# Patient Record
Sex: MALE | Race: WHITE | Employment: OTHER | URBAN - METROPOLITAN AREA
[De-identification: names, ages, dates, MRNs, and addresses within clinical notes are randomized per-mention and may not be internally consistent; named-entity substitution may affect disease eponyms.]

---

## 2019-12-20 ENCOUNTER — TRANSFERRED RECORDS (OUTPATIENT)
Dept: HEALTH INFORMATION MANAGEMENT | Facility: CLINIC | Age: 84
End: 2019-12-20

## 2020-01-06 ENCOUNTER — TRANSFERRED RECORDS (OUTPATIENT)
Dept: HEALTH INFORMATION MANAGEMENT | Facility: CLINIC | Age: 85
End: 2020-01-06

## 2020-01-16 ENCOUNTER — TRANSFERRED RECORDS (OUTPATIENT)
Dept: HEALTH INFORMATION MANAGEMENT | Facility: CLINIC | Age: 85
End: 2020-01-16

## 2020-02-17 ENCOUNTER — OFFICE VISIT (OUTPATIENT)
Dept: NEUROSURGERY | Facility: CLINIC | Age: 85
End: 2020-02-17
Attending: NEUROLOGICAL SURGERY
Payer: MEDICARE

## 2020-02-17 VITALS
WEIGHT: 198 LBS | SYSTOLIC BLOOD PRESSURE: 112 MMHG | BODY MASS INDEX: 26.82 KG/M2 | HEIGHT: 72 IN | RESPIRATION RATE: 16 BRPM | HEART RATE: 68 BPM | OXYGEN SATURATION: 98 % | DIASTOLIC BLOOD PRESSURE: 73 MMHG

## 2020-02-17 DIAGNOSIS — M51.369 LUMBAR DEGENERATIVE DISC DISEASE: Primary | ICD-10-CM

## 2020-02-17 PROCEDURE — 99204 OFFICE O/P NEW MOD 45 MIN: CPT | Performed by: NEUROLOGICAL SURGERY

## 2020-02-17 PROCEDURE — G0463 HOSPITAL OUTPT CLINIC VISIT: HCPCS

## 2020-02-17 ASSESSMENT — PAIN SCALES - GENERAL: PAINLEVEL: SEVERE PAIN (7)

## 2020-02-17 ASSESSMENT — MIFFLIN-ST. JEOR: SCORE: 1626.12

## 2020-02-17 NOTE — PROGRESS NOTES
Mr. Santos is an 84-year-old man well-known to me from prior lumbar surgery for decompression of the L4-5 level performed at [facility] several years previously.  He did well after that surgery and was recently seen by my former partner Dr. Martines for evaluation of left hip and left knee pain.  I have had the opportunity to review the notes from [facility].  Repeat lumbar MRI scan obtained at [facility] recently showed evidence of multilevel degenerative lumbar disc changes most pronounced at right L4-5.  Mr. Santos was also noted to have a left L5-S1 severe foraminal stenosis.  Dr. Martines saw him, performed an examination, reviewed the MRI scan and stated that he did not see a structural cause for continuing left leg pain in the lumbar spine.  Since that time, he is undergone an injection into his left hip and possibly into the lateral aspect of his left knee.  He reported significant symptomatic improvement after those injections.  He notes that a prior left L5-S1 selective foraminal epidural steroid injection was not helpful in reducing symptoms into his left leg beyond the anesthetic phase.  Presently, he has no complaint of weakness or numbness in his legs.  His symptoms are not consistent with neurogenic claudication and his residual pain is intermittent near his left buttock.  He is also experienced point tenderness over the left tibial plateau which has been reproducible with pressure in that area.    On examination he is able to rise from a seated to a standing position without difficulty.  Gait is slow but without evidence of antalgic gait.  Plantar and dorsiflexion at the ankles is well-preserved and equal.  Deep tendon reflexes are diffusely diminished through his lower extremities and there are no long track findings noted.  There is no evidence of dermatomal sensory disturbance.  Straight leg raising is negative for radicular symptoms bilaterally.    I have also had the opportunity  to review the recent lumbar MRI scan obtained at [facility].  I do not believe that his symptoms now are previously were consistent with an L5 radiculopathy and see no indication for surgical intervention.  I reviewed the clinical and radiographic findings in some detail with the patient and his .  He has been referred to physician neck and back clinic for strengthening and therapy which I believe is an appropriate recommendation.  I answered his questions to his apparent satisfaction ultimately, he seemed relieved that surgery was not contemplated and I am encouraged that his symptoms have substantially diminished in the past several weeks.  I have asked him to call or return to the office in any time should his symptoms return to the previous level severity or change significantly in character or location.    More than 45 minutes was spent in direct contact with Mr. Santos, reviewing relevant radiographic findings and outside clinical notes as well as counseling him regarding differential diagnosis, prognosis and management options.

## 2020-02-17 NOTE — LETTER
2/17/2020         RE: Riley Santos  [address] [id number]        Dear Colleague,    Thank you for referring your patient, Riley Santos, to the [facility] NEUROS[facility]ERY [facility]INIC. Please see a copy of my visit note below.    Mr. Santos is an 84-year-old man well-known to me from prior lumbar surgery for decompression of the L4-5 level performed at [facility] several years previously.  He did well after that surgery and was recently seen by my former partner Dr. Martines for evaluation of left hip and left knee pain.  I have had the opportunity to review the notes from [facility].  Repeat lumbar MRI scan obtained at [facility] recently showed evidence of multilevel degenerative lumbar disc changes most pronounced at right L4-5.  Mr. Santos was also noted to have a left L5-S1 severe foraminal stenosis.  Dr. Martines saw him, performed an examination, reviewed the MRI scan and stated that he did not see a structural cause for continuing left leg pain in the lumbar spine.  Since that time, he is undergone an injection into his left hip and possibly into the lateral aspect of his left knee.  He reported significant symptomatic improvement after those injections.  He notes that a prior left L5-S1 selective foraminal epidural steroid injection was not helpful in reducing symptoms into his left leg beyond the anesthetic phase.  Presently, he has no complaint of weakness or numbness in his legs.  His symptoms are not consistent with neurogenic claudication and his residual pain is intermittent near his left buttock.  He is also experienced point tenderness over the left tibial plateau which has been reproducible with pressure in that area.    On examination he is able to rise from a seated to a standing position without difficulty.  Gait is slow but without evidence of antalgic gait.  Plantar and dorsiflexion at the ankles is well-preserved and equal.  Deep tendon  reflexes are diffusely diminished through his lower extremities and there are no long track findings noted.  There is no evidence of dermatomal sensory disturbance.  Straight leg raising is negative for radicular symptoms bilaterally.    I have also had the opportunity to review the recent lumbar MRI scan obtained at [facility].  I do not believe that his symptoms now are previously were consistent with an L5 radiculopathy and see no indication for surgical intervention.  I reviewed the clinical and radiographic findings in some detail with the patient and his .  He has been referred to physician neck and back clinic for strengthening and therapy which I believe is an appropriate recommendation.  I answered his questions to his apparent satisfaction ultimately, he seemed relieved that surgery was not contemplated and I am encouraged that his symptoms have substantially diminished in the past several weeks.  I have asked him to call or return to the office in any time should his symptoms return to the previous level severity or change significantly in character or location.    More than 45 minutes was spent in direct contact with Mr. Santos, reviewing relevant radiographic findings and outside clinical notes as well as counseling him regarding differential diagnosis, prognosis and management options.    Again, thank you for allowing me to participate in the care of your patient.        Sincerely,        Prieto Norwood MD

## 2020-02-17 NOTE — NURSING NOTE
Riley Santos is a 84 year old male who presents for:  Chief Complaint   Patient presents with     Consult For     lumbar radiculopathy        Initial Vitals:  /73   Pulse 68   Resp 16   Ht 6' (1.829 m)   Wt 198 lb (89.8 kg)   SpO2 98%   BMI 26.85 kg/m   Estimated body mass index is 26.85 kg/m  as calculated from the following:    Height as of this encounter: 6' (1.829 m).    Weight as of this encounter: 198 lb (89.8 kg).. Body surface area is 2.14 meters squared. BP completed using cuff size: regular  Severe Pain (7)    Nursing Comments: Pt present today for consult of LBP.    Thong Chiu, CMA

## 2020-06-23 ENCOUNTER — OFFICE VISIT (OUTPATIENT)
Dept: NEUROSURGERY | Facility: CLINIC | Age: 85
End: 2020-06-23
Attending: NEUROLOGICAL SURGERY
Payer: MEDICARE

## 2020-06-23 VITALS
HEIGHT: 72 IN | OXYGEN SATURATION: 96 % | RESPIRATION RATE: 16 BRPM | SYSTOLIC BLOOD PRESSURE: 157 MMHG | WEIGHT: 198 LBS | HEART RATE: 74 BPM | DIASTOLIC BLOOD PRESSURE: 91 MMHG | BODY MASS INDEX: 26.82 KG/M2

## 2020-06-23 DIAGNOSIS — M51.369 LUMBAR DEGENERATIVE DISC DISEASE: Primary | ICD-10-CM

## 2020-06-23 PROCEDURE — 99215 OFFICE O/P EST HI 40 MIN: CPT | Performed by: NEUROLOGICAL SURGERY

## 2020-06-23 PROCEDURE — G0463 HOSPITAL OUTPT CLINIC VISIT: HCPCS

## 2020-06-23 ASSESSMENT — MIFFLIN-ST. JEOR: SCORE: 1626.12

## 2020-06-23 ASSESSMENT — PAIN SCALES - GENERAL: PAINLEVEL: EXTREME PAIN (9)

## 2020-06-23 NOTE — LETTER
6/23/2020         RE: Riley Santos  [address]  [place] [address]        Dear Colleague,    Thank you for referring your patient, Riley Santos, to the [facility]UROSUR[facility]ERY [facility]INIC. Please see a copy of my visit note below.    Mr. Santos returns requesting reevaluation of intractable left hip and left knee pain.  I reviewed prior history including my initial consultation on Mr. Santos, Dr. Martines neurosurgical evaluation from [facility] and recent notes from [facility] [place] from Dr. Belem Truong.  I repeated portions of the examination specifically for details of the distribution of Mr. Santos's pain complaints.  His primary complaint presently appears to be intractable pain just below his left patella extending downward toward the tibia and over the lateral aspect of his knee.  He reports is particularly painful when weightbearing or walking.  He also complains of pain over the left lateral hip extending toward the buttock.    Review once again of his most recent lumbar MRI scan shows multilevel lumbar spondylitic change but no compelling evidence of focal neurologic impingement on the left side of the canal.  Comparison of this study with a prior study obtained in December 2019 to my review shows no significant interval change.    I reviewed the differential diagnosis once again with the patient and his friend in great detail.  I told them that I do not believe that this is a neurologic issue and that no therapy or surgery directed at his lumbar spine would likely prove to provide any degree of symptomatic relief.  He is scheduled to see a pain clinic provider later this week.  A second opinion regarding orthopedic evaluation of his left hip and left knee might also be considered.    More than 40 minutes was spent in direct contact with the patient reviewing clinical and radiographic findings and through a very long discussion of all other diagnostic and  therapeutic possibilities.    Again, thank you for allowing me to participate in the care of your patient.        Sincerely,        Prieto Norwood MD

## 2020-06-23 NOTE — PROGRESS NOTES
Mr. Santos returns requesting reevaluation of intractable left hip and left knee pain.  I reviewed prior history including my initial consultation on Mr. Santos, Dr. Martines neurosurgical evaluation from [facility] and recent notes from [facility] orthopedic center from Dr. Belem Truong.  I repeated portions of the examination specifically for details of the distribution of Mr. Santos's pain complaints.  His primary complaint presently appears to be intractable pain just below his left patella extending downward toward the tibia and over the lateral aspect of his knee.  He reports is particularly painful when weightbearing or walking.  He also complains of pain over the left lateral hip extending toward the buttock.    Review once again of his most recent lumbar MRI scan shows multilevel lumbar spondylitic change but no compelling evidence of focal neurologic impingement on the left side of the canal.  Comparison of this study with a prior study obtained in December 2019 to my review shows no significant interval change.    I reviewed the differential diagnosis once again with the patient and his friend in great detail.  I told them that I do not believe that this is a neurologic issue and that no therapy or surgery directed at his lumbar spine would likely prove to provide any degree of symptomatic relief.  He is scheduled to see a pain clinic provider later this week.  A second opinion regarding orthopedic evaluation of his left hip and left knee might also be considered.    More than 40 minutes was spent in direct contact with the patient reviewing clinical and radiographic findings and through a very long discussion of all other diagnostic and therapeutic possibilities.

## 2020-06-23 NOTE — NURSING NOTE
Riley Santos is a 84 year old male who presents for:  Chief Complaint   Patient presents with     Consult For     Lumbar        Initial Vitals:  BP (!) 157/91   Pulse 74   Resp 16   Ht 6' (1.829 m)   Wt 198 lb (89.8 kg)   SpO2 96%   BMI 26.85 kg/m   Estimated body mass index is 26.85 kg/m  as calculated from the following:    Height as of this encounter: 6' (1.829 m).    Weight as of this encounter: 198 lb (89.8 kg).. Body surface area is 2.14 meters squared. BP completed using cuff size: regular  Extreme Pain (9)    Nursing Comments: Pt present today for lumbar.    Thong Chiu, CMA

## 2020-11-21 ENCOUNTER — OFFICE VISIT (OUTPATIENT)
Dept: URGENT CARE | Facility: URGENT CARE | Age: 85
End: 2020-11-21
Payer: MEDICARE

## 2020-11-21 VITALS
SYSTOLIC BLOOD PRESSURE: 102 MMHG | TEMPERATURE: 97.1 F | DIASTOLIC BLOOD PRESSURE: 62 MMHG | BODY MASS INDEX: 26.85 KG/M2 | OXYGEN SATURATION: 99 % | HEART RATE: 89 BPM | WEIGHT: 198 LBS

## 2020-11-21 DIAGNOSIS — M79.89 SWELLING OF LIMB: Primary | ICD-10-CM

## 2020-11-21 LAB
BASOPHILS # BLD AUTO: 0 10E9/L (ref 0–0.2)
BASOPHILS NFR BLD AUTO: 0.1 %
DIFFERENTIAL METHOD BLD: NORMAL
EOSINOPHIL # BLD AUTO: 0.2 10E9/L (ref 0–0.7)
EOSINOPHIL NFR BLD AUTO: 2.6 %
LYMPHOCYTES # BLD AUTO: 1 10E9/L (ref 0.8–5.3)
LYMPHOCYTES NFR BLD AUTO: 13.3 %
MONOCYTES # BLD AUTO: 0.8 10E9/L (ref 0–1.3)
MONOCYTES NFR BLD AUTO: 10.3 %
NEUTROPHILS # BLD AUTO: 5.7 10E9/L (ref 1.6–8.3)
NEUTROPHILS NFR BLD AUTO: 73.7 %
WBC # BLD AUTO: 7.7 10E9/L (ref 4–11)

## 2020-11-21 PROCEDURE — 85004 AUTOMATED DIFF WBC COUNT: CPT | Performed by: PHYSICIAN ASSISTANT

## 2020-11-21 PROCEDURE — 80048 BASIC METABOLIC PNL TOTAL CA: CPT | Performed by: PHYSICIAN ASSISTANT

## 2020-11-21 PROCEDURE — 99203 OFFICE O/P NEW LOW 30 MIN: CPT | Performed by: PHYSICIAN ASSISTANT

## 2020-11-21 PROCEDURE — 36415 COLL VENOUS BLD VENIPUNCTURE: CPT | Performed by: PHYSICIAN ASSISTANT

## 2020-11-21 PROCEDURE — 85048 AUTOMATED LEUKOCYTE COUNT: CPT | Performed by: PHYSICIAN ASSISTANT

## 2020-11-21 NOTE — PATIENT INSTRUCTIONS
Rest, Fluids    Follow up with Care team Monday    If you develop pain, warmth, fever, numbness, chest pain follow up in Emergency Department.     We will call you with metabolic panel tomorrow if abnormal, in the meantime, monitor your sugar and follow up as needed    Follow up with any changes

## 2020-11-22 LAB
ANION GAP SERPL CALCULATED.3IONS-SCNC: <1 MMOL/L (ref 3–14)
BUN SERPL-MCNC: 23 MG/DL (ref 7–30)
CALCIUM SERPL-MCNC: 8.5 MG/DL (ref 8.5–10.1)
CHLORIDE SERPL-SCNC: 110 MMOL/L (ref 94–109)
CO2 SERPL-SCNC: 28 MMOL/L (ref 20–32)
CREAT SERPL-MCNC: 1.06 MG/DL (ref 0.66–1.25)
GFR SERPL CREATININE-BSD FRML MDRD: 63 ML/MIN/{1.73_M2}
GLUCOSE SERPL-MCNC: 156 MG/DL (ref 70–99)
POTASSIUM SERPL-SCNC: 4.4 MMOL/L (ref 3.4–5.3)
SODIUM SERPL-SCNC: 138 MMOL/L (ref 133–144)

## 2020-11-22 NOTE — PROGRESS NOTES
SUBJECTIVE:  Riley Santos is a 85 year old male who presents to the clinic today for groin and leg swelling.  Has been present for 3 weeks since surgery.  Spoke with surgical team this week that stated symptoms are normal for hip replacement.  Pt states no pain.  No redness.  No warmth.  No dicharge. Of note patient not been icing as instructed, or doing physical therapy exercises that have been prescribed. Symptoms have not worsened, just aren't improving.      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     Diabetes (H)     type 2     HTN (hypertension)      Hyperlipidemia      Pneumonia 1/2015    tx with [facility]     Radiculopathy, lumbar region      Right sided sciatica      Stenosis of cervical spine      Thrombocytopenia (H)      Current Outpatient Medications   Medication Sig Dispense Refill     ASPIRIN PO Take 81 mg by mouth daily       Atorvastatin Calcium (LIPITOR PO) Take 20 mg by mouth daily       Carvedilol (COREG PO) Take 50 mg by mouth 2 times daily (with meals)       Fexofenadine HCl (ALLEGRA PO) Take 180 mg by mouth       GLYBURIDE PO Take 10 mg by mouth 2 times daily (with meals)       insulin aspart (NOVOLOG FLEXPEN) 100 UNIT/ML soln Inject 10 Units Subcutaneous 3 times daily (with meals)       insulin NPH (HUMULIN N, NOVOLIN N) 100 UNIT/ML VIAL Inject Subcutaneous 2 times daily (before meals)       LISINOPRIL PO Take 20 mg by mouth daily       METFORMIN HCL PO Take 750 mg by mouth 2 times daily (with meals)       NITROGLYCERIN SL Place 0.4 mg under the tongue       Social History     Tobacco Use     Smoking status: Never Smoker     Smokeless tobacco: Never Used   Substance Use Topics     Alcohol use: Not on file       ROS:  10 point ROS negative except as listed above      EXAM:   /62   Pulse 89   Temp 97.1  F (36.2  C) (Tympanic)   Wt 89.8 kg (198 lb)   SpO2 99%   BMI 26.85 kg/m    GENERAL: alert,  no acute distress.  SKIN: swelling of thigh, and groin. Non-tender, no erythema  GROIN: no swelling of scrotum.  Patient notes occasionally tissue in foreskin with swell.  No pain and no issues with retraction.  No discharge.  EYES: conjunctiva clear  RESP: lungs clear to auscultation - no rales, rhonchi or wheezes  CV: regular rates and rhythm, normal S1 S2, no murmur noted  NEURO: Normal strength and tone, sensory exam grossly normal,  normal speech and mentation      Results for orders placed or performed in visit on 11/21/20   WBC with Diff     Status: None   Result Value Ref Range    WBC 7.7 4.0 - 11.0 10e9/L    Diff Method Automated Method     % Neutrophils 73.7 %    % Lymphocytes 13.3 %    % Monocytes 10.3 %    % Eosinophils 2.6 %    % Basophils 0.1 %    Absolute Neutrophil 5.7 1.6 - 8.3 10e9/L    Absolute Lymphocytes 1.0 0.8 - 5.3 10e9/L    Absolute Monocytes 0.8 0.0 - 1.3 10e9/L    Absolute Eosinophils 0.2 0.0 - 0.7 10e9/L    Absolute Basophils 0.0 0.0 - 0.2 10e9/L       ASSESSMENT:  (M79.89) Swelling of limb  (primary encounter diagnosis)  Comment: likely post surgical.  I will defer to surgical team's assessment.   Plan: WBC with Diff, Basic metabolic panel  (Ca, Cl,         CO2, Creat, Gluc, K, Na, BUN)        Follow up with surgical team Monday    Return with new or worsening symptoms    Patient Instructions   Rest, Fluids    Follow up with Care team Monday    If you develop pain, warmth, fever, numbness, chest pain follow up in Emergency Department.     We will call you with metabolic panel tomorrow if abnormal, in the meantime, monitor your sugar and follow up as needed    Follow up with any changes

## 2021-01-10 ENCOUNTER — HEALTH MAINTENANCE LETTER (OUTPATIENT)
Age: 86
End: 2021-01-10

## 2021-02-01 ENCOUNTER — IMMUNIZATION (OUTPATIENT)
Dept: NURSING | Facility: CLINIC | Age: 86
End: 2021-02-01
Payer: MEDICARE

## 2021-02-01 PROCEDURE — 0001A PR COVID VAC PFIZER DIL RECON 30 MCG/0.3 ML IM: CPT

## 2021-02-01 PROCEDURE — 91300 PR COVID VAC PFIZER DIL RECON 30 MCG/0.3 ML IM: CPT

## 2021-02-22 ENCOUNTER — IMMUNIZATION (OUTPATIENT)
Dept: NURSING | Facility: CLINIC | Age: 86
End: 2021-02-22
Attending: INTERNAL MEDICINE
Payer: MEDICARE

## 2021-02-22 PROCEDURE — 0002A PR COVID VAC PFIZER DIL RECON 30 MCG/0.3 ML IM: CPT

## 2021-02-22 PROCEDURE — 91300 PR COVID VAC PFIZER DIL RECON 30 MCG/0.3 ML IM: CPT

## 2021-10-23 ENCOUNTER — HEALTH MAINTENANCE LETTER (OUTPATIENT)
Age: 86
End: 2021-10-23

## 2022-02-12 ENCOUNTER — HEALTH MAINTENANCE LETTER (OUTPATIENT)
Age: 87
End: 2022-02-12

## 2022-10-09 ENCOUNTER — HEALTH MAINTENANCE LETTER (OUTPATIENT)
Age: 87
End: 2022-10-09

## 2023-02-18 ENCOUNTER — HEALTH MAINTENANCE LETTER (OUTPATIENT)
Age: 88
End: 2023-02-18

## 2023-08-13 ENCOUNTER — LAB REQUISITION (OUTPATIENT)
Dept: LAB | Facility: CLINIC | Age: 88
End: 2023-08-13
Payer: MEDICARE

## 2023-08-13 DIAGNOSIS — E11.9 TYPE 2 DIABETES MELLITUS WITHOUT COMPLICATIONS (H): ICD-10-CM

## 2023-08-13 DIAGNOSIS — I50.23 ACUTE ON CHRONIC SYSTOLIC (CONGESTIVE) HEART FAILURE (H): ICD-10-CM

## 2023-08-13 DIAGNOSIS — I10 ESSENTIAL (PRIMARY) HYPERTENSION: ICD-10-CM

## 2023-08-13 DIAGNOSIS — D64.9 ANEMIA, UNSPECIFIED: ICD-10-CM

## 2023-08-17 ENCOUNTER — LAB REQUISITION (OUTPATIENT)
Dept: LAB | Facility: CLINIC | Age: 88
End: 2023-08-17
Payer: MEDICARE

## 2023-08-17 DIAGNOSIS — D64.9 ANEMIA, UNSPECIFIED: ICD-10-CM

## 2023-08-17 DIAGNOSIS — E03.9 HYPOTHYROIDISM, UNSPECIFIED: ICD-10-CM

## 2023-08-17 DIAGNOSIS — I10 ESSENTIAL (PRIMARY) HYPERTENSION: ICD-10-CM

## 2023-08-19 ENCOUNTER — HEALTH MAINTENANCE LETTER (OUTPATIENT)
Age: 88
End: 2023-08-19

## 2023-09-02 ENCOUNTER — LAB REQUISITION (OUTPATIENT)
Dept: LAB | Facility: CLINIC | Age: 88
End: 2023-09-02
Payer: MEDICARE

## 2023-09-02 DIAGNOSIS — E87.70 FLUID OVERLOAD, UNSPECIFIED: ICD-10-CM

## 2023-09-05 LAB
ANION GAP SERPL CALCULATED.3IONS-SCNC: 15 MMOL/L (ref 7–15)
BUN SERPL-MCNC: 42.7 MG/DL (ref 8–23)
CALCIUM SERPL-MCNC: 9.2 MG/DL (ref 8.8–10.2)
CHLORIDE SERPL-SCNC: 95 MMOL/L (ref 98–107)
CREAT SERPL-MCNC: 2 MG/DL (ref 0.67–1.17)
DEPRECATED HCO3 PLAS-SCNC: 26 MMOL/L (ref 22–29)
GFR SERPL CREATININE-BSD FRML MDRD: 32 ML/MIN/1.73M2
GLUCOSE SERPL-MCNC: 284 MG/DL (ref 70–99)
NT-PROBNP SERPL-MCNC: 4128 PG/ML (ref 0–1800)
POTASSIUM SERPL-SCNC: 3.9 MMOL/L (ref 3.4–5.3)
SODIUM SERPL-SCNC: 136 MMOL/L (ref 136–145)

## 2023-09-05 PROCEDURE — P9604 ONE-WAY ALLOW PRORATED TRIP: HCPCS | Performed by: FAMILY MEDICINE

## 2023-09-05 PROCEDURE — 82310 ASSAY OF CALCIUM: CPT | Performed by: FAMILY MEDICINE

## 2023-09-05 PROCEDURE — 36415 COLL VENOUS BLD VENIPUNCTURE: CPT | Mod: ORL | Performed by: FAMILY MEDICINE

## 2023-09-05 PROCEDURE — 83880 ASSAY OF NATRIURETIC PEPTIDE: CPT | Mod: ORL | Performed by: FAMILY MEDICINE

## 2023-10-30 ENCOUNTER — LAB REQUISITION (OUTPATIENT)
Dept: LAB | Facility: CLINIC | Age: 88
End: 2023-10-30
Payer: MEDICARE

## 2023-10-30 DIAGNOSIS — I10 ESSENTIAL (PRIMARY) HYPERTENSION: ICD-10-CM

## 2023-10-30 DIAGNOSIS — D64.9 ANEMIA, UNSPECIFIED: ICD-10-CM

## 2023-10-31 LAB
ERYTHROCYTE [DISTWIDTH] IN BLOOD BY AUTOMATED COUNT: 15.9 % (ref 10–15)
HCT VFR BLD AUTO: 45.9 % (ref 40–53)
HGB BLD-MCNC: 15.3 G/DL (ref 13.3–17.7)
MCH RBC QN AUTO: 29.6 PG (ref 26.5–33)
MCHC RBC AUTO-ENTMCNC: 33.3 G/DL (ref 31.5–36.5)
MCV RBC AUTO: 89 FL (ref 78–100)
PLATELET # BLD AUTO: 279 10E3/UL (ref 150–450)
RBC # BLD AUTO: 5.17 10E6/UL (ref 4.4–5.9)
WBC # BLD AUTO: 11.8 10E3/UL (ref 4–11)

## 2023-10-31 PROCEDURE — 36415 COLL VENOUS BLD VENIPUNCTURE: CPT | Mod: ORL | Performed by: NURSE PRACTITIONER

## 2023-10-31 PROCEDURE — P9603 ONE-WAY ALLOW PRORATED MILES: HCPCS | Mod: ORL | Performed by: NURSE PRACTITIONER

## 2023-10-31 PROCEDURE — 80048 BASIC METABOLIC PNL TOTAL CA: CPT | Mod: ORL | Performed by: NURSE PRACTITIONER

## 2023-10-31 PROCEDURE — 85027 COMPLETE CBC AUTOMATED: CPT | Mod: ORL | Performed by: NURSE PRACTITIONER

## 2023-11-01 LAB
ANION GAP SERPL CALCULATED.3IONS-SCNC: 15 MMOL/L (ref 7–15)
BUN SERPL-MCNC: 32.9 MG/DL (ref 8–23)
CALCIUM SERPL-MCNC: 9.4 MG/DL (ref 8.8–10.2)
CHLORIDE SERPL-SCNC: 95 MMOL/L (ref 98–107)
CREAT SERPL-MCNC: 1.83 MG/DL (ref 0.67–1.17)
DEPRECATED HCO3 PLAS-SCNC: 24 MMOL/L (ref 22–29)
EGFRCR SERPLBLD CKD-EPI 2021: 35 ML/MIN/1.73M2
GLUCOSE SERPL-MCNC: 278 MG/DL (ref 70–99)
POTASSIUM SERPL-SCNC: 4.3 MMOL/L (ref 3.4–5.3)
SODIUM SERPL-SCNC: 134 MMOL/L (ref 135–145)

## 2023-11-27 ENCOUNTER — LAB REQUISITION (OUTPATIENT)
Dept: LAB | Facility: CLINIC | Age: 88
End: 2023-11-27
Payer: MEDICARE

## 2023-11-27 DIAGNOSIS — I10 ESSENTIAL (PRIMARY) HYPERTENSION: ICD-10-CM

## 2023-11-27 DIAGNOSIS — D64.9 ANEMIA, UNSPECIFIED: ICD-10-CM

## 2024-01-06 ENCOUNTER — HEALTH MAINTENANCE LETTER (OUTPATIENT)
Age: 89
End: 2024-01-06

## 2024-03-16 ENCOUNTER — HEALTH MAINTENANCE LETTER (OUTPATIENT)
Age: 89
End: 2024-03-16

## 2025-04-08 ENCOUNTER — LAB REQUISITION (OUTPATIENT)
Dept: LAB | Facility: CLINIC | Age: OVER 89
End: 2025-04-08
Payer: MEDICARE

## 2025-04-08 DIAGNOSIS — L89.620 PRESSURE ULCER OF LEFT HEEL, UNSTAGEABLE (H): ICD-10-CM

## 2025-04-08 LAB
BACTERIA SPEC CULT: ABNORMAL
GRAM STAIN RESULT: ABNORMAL
GRAM STAIN RESULT: ABNORMAL

## 2025-04-08 PROCEDURE — 87205 SMEAR GRAM STAIN: CPT | Mod: ORL

## 2025-04-08 PROCEDURE — 87070 CULTURE OTHR SPECIMN AEROBIC: CPT | Mod: ORL

## 2025-04-09 LAB
BACTERIA WND CULT: ABNORMAL
BACTERIA WND CULT: ABNORMAL

## 2025-04-13 LAB
BACTERIA WND CULT: ABNORMAL
BACTERIA WND CULT: ABNORMAL